# Patient Record
Sex: MALE | Race: BLACK OR AFRICAN AMERICAN | NOT HISPANIC OR LATINO | URBAN - METROPOLITAN AREA
[De-identification: names, ages, dates, MRNs, and addresses within clinical notes are randomized per-mention and may not be internally consistent; named-entity substitution may affect disease eponyms.]

---

## 2019-03-07 ENCOUNTER — EMERGENCY (EMERGENCY)
Facility: HOSPITAL | Age: 44
LOS: 1 days | Discharge: HOME | End: 2019-03-07
Admitting: EMERGENCY MEDICINE
Payer: COMMERCIAL

## 2019-03-07 VITALS
OXYGEN SATURATION: 97 % | RESPIRATION RATE: 17 BRPM | HEART RATE: 73 BPM | TEMPERATURE: 98 F | SYSTOLIC BLOOD PRESSURE: 134 MMHG | DIASTOLIC BLOOD PRESSURE: 89 MMHG

## 2019-03-07 DIAGNOSIS — Z90.49 ACQUIRED ABSENCE OF OTHER SPECIFIED PARTS OF DIGESTIVE TRACT: Chronic | ICD-10-CM

## 2019-03-07 PROCEDURE — 99283 EMERGENCY DEPT VISIT LOW MDM: CPT

## 2019-03-07 RX ORDER — LIDOCAINE 4 G/100G
1 CREAM TOPICAL ONCE
Qty: 0 | Refills: 0 | Status: COMPLETED | OUTPATIENT
Start: 2019-03-07 | End: 2019-03-07

## 2019-03-07 RX ORDER — CYCLOBENZAPRINE HYDROCHLORIDE 10 MG/1
1 TABLET, FILM COATED ORAL
Qty: 10 | Refills: 0 | OUTPATIENT
Start: 2019-03-07

## 2019-03-07 RX ORDER — IBUPROFEN 200 MG
1 TABLET ORAL
Qty: 15 | Refills: 0 | OUTPATIENT
Start: 2019-03-07

## 2019-03-07 RX ORDER — KETOROLAC TROMETHAMINE 30 MG/ML
30 SYRINGE (ML) INJECTION ONCE
Qty: 0 | Refills: 0 | Status: DISCONTINUED | OUTPATIENT
Start: 2019-03-07 | End: 2019-03-07

## 2019-03-07 RX ADMIN — LIDOCAINE 1 PATCH: 4 CREAM TOPICAL at 15:48

## 2019-03-07 RX ADMIN — Medication 30 MILLIGRAM(S): at 15:48

## 2019-03-07 NOTE — ED PROVIDER NOTE - CHPI ED SYMPTOMS NEG
no tingling/no numbness/no bowel dysfunction/no bladder dysfunction no tingling/no motor function loss/no numbness/no bowel dysfunction/no difficulty bearing weight/no bladder dysfunction

## 2019-03-07 NOTE — ED PROVIDER NOTE - PHYSICAL EXAMINATION
left sided paraspinal lumbar ttp, no midline ttp, normal gait, positive left sided leg raise, strength 5/5 all extremities, NV intact Back: left sided paraspinal lumbar TTP, no midline TTP, normal gait, positive left sided SLR, strength 5/5 all extremities, NV intact.

## 2019-03-07 NOTE — ED PROVIDER NOTE - CLINICAL SUMMARY MEDICAL DECISION MAKING FREE TEXT BOX
43 y/o male with left sided back pain likely low back strain/spasm pain control follow up PMD/spine.

## 2019-03-07 NOTE — ED PROVIDER NOTE - OBJECTIVE STATEMENT
43 y/o male with no significant PMH presents to ER c/o 1 wk left sided low back pain. Pt reports intermittent similar back pain over last 5 years. Pt reports MRI last year with no significant findings. Pt's job involves heavy lifting. Pt denies fall or trauma, weakness, numbness, tingling, fecal/urinary incontinence, fevers, chills, dysuria, frequency. pt states pain radiates down left leg only when ambulating. PSH appendectomy. 45 y/o male with no significant PMHx presents to the ER c/o 1 week of left sided low back pain. Pt reports intermittent similar back pain over the last 5 years. Pt reports MRI last year with no significant findings. Pt's job involves heavy lifting. Pt denies fall, trauma, weakness, numbness, tingling, fecal/urinary incontinence, fevers, chills, dysuria, frequency. Pt states pain radiates down his left leg only when ambulating. PSHx appendectomy.

## 2020-03-03 ENCOUNTER — EMERGENCY (EMERGENCY)
Facility: HOSPITAL | Age: 45
LOS: 1 days | Discharge: HOME | End: 2020-03-03
Attending: EMERGENCY MEDICINE | Admitting: EMERGENCY MEDICINE
Payer: COMMERCIAL

## 2020-03-03 VITALS
RESPIRATION RATE: 18 BRPM | TEMPERATURE: 98 F | SYSTOLIC BLOOD PRESSURE: 149 MMHG | OXYGEN SATURATION: 96 % | HEART RATE: 85 BPM | DIASTOLIC BLOOD PRESSURE: 85 MMHG

## 2020-03-03 VITALS
HEART RATE: 64 BPM | DIASTOLIC BLOOD PRESSURE: 82 MMHG | RESPIRATION RATE: 19 BRPM | TEMPERATURE: 98 F | OXYGEN SATURATION: 99 % | SYSTOLIC BLOOD PRESSURE: 117 MMHG

## 2020-03-03 DIAGNOSIS — Z90.49 ACQUIRED ABSENCE OF OTHER SPECIFIED PARTS OF DIGESTIVE TRACT: Chronic | ICD-10-CM

## 2020-03-03 PROCEDURE — 99284 EMERGENCY DEPT VISIT MOD MDM: CPT

## 2020-03-03 RX ORDER — CYCLOBENZAPRINE HYDROCHLORIDE 10 MG/1
1 TABLET, FILM COATED ORAL
Qty: 12 | Refills: 0
Start: 2020-03-03 | End: 2020-03-06

## 2020-03-03 RX ORDER — LIDOCAINE 4 G/100G
1 CREAM TOPICAL ONCE
Refills: 0 | Status: COMPLETED | OUTPATIENT
Start: 2020-03-03 | End: 2020-03-03

## 2020-03-03 RX ORDER — KETOROLAC TROMETHAMINE 30 MG/ML
15 SYRINGE (ML) INJECTION ONCE
Refills: 0 | Status: DISCONTINUED | OUTPATIENT
Start: 2020-03-03 | End: 2020-03-03

## 2020-03-03 RX ORDER — ACETAMINOPHEN 500 MG
2 TABLET ORAL
Qty: 360 | Refills: 0
Start: 2020-03-03 | End: 2020-04-01

## 2020-03-03 RX ORDER — ACETAMINOPHEN 500 MG
975 TABLET ORAL ONCE
Refills: 0 | Status: COMPLETED | OUTPATIENT
Start: 2020-03-03 | End: 2020-03-03

## 2020-03-03 RX ADMIN — Medication 15 MILLIGRAM(S): at 09:26

## 2020-03-03 RX ADMIN — Medication 975 MILLIGRAM(S): at 09:28

## 2020-03-03 RX ADMIN — LIDOCAINE 1 PATCH: 4 CREAM TOPICAL at 09:28
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
## 2020-03-03 NOTE — ED PROVIDER NOTE - PATIENT PORTAL LINK FT
You can access the FollowMyHealth Patient Portal offered by [facility] by registering at the following website: [email or web address]. By joining [employer]’s FollowMyHealth portal, you will also be able to view your health information using other applications (apps) compatible with our system.
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
## 2020-03-03 NOTE — ED PROVIDER NOTE - PROGRESS NOTE DETAILS
Sandhya Uribe MD: Moderate improvement of pain. Will dc with flexeril, naproxen, tylenol and PMD f/u.
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
## 2020-03-03 NOTE — ED PROVIDER NOTE - PHYSICAL EXAMINATION
CONSTITUTIONAL: Nontoxic, well nourished, well developed, middle-aged male, resting comfortably in no acute distress  HEAD: Normocephalic; atraumatic  EYES: Normal inspection, EOMI  ENMT: External appears normal; normal oropharynx  NECK: Supple; non-tender; no cervical lymphadenopathy, good ROM of neck   CARD: RRR; no audible murmurs, rubs, or gallops  RESP: No respiratory distress, lungs ctab/l  ABD: Soft, non-distended; non-tender; no rebound or guarding  EXT: No LE pitting edema or calf tenderness; distal pulses intact with good capillary refill  MSK: no midline TTP, no step offs   SKIN: Warm, dry, intact  NEURO: aaox3, 5/5 strength b/l UE, sensation intact in all extremities

## 2020-03-03 NOTE — ED PROVIDER NOTE - NSFOLLOWUPINSTRUCTIONS_ED_ALL_ED_FT
-Take naproxen/acetaminophen for pain as needed.  Take 1-2 tablets of naproxen 250mg every 12 hours as needed for moderate pain--take with food. Do not take this medication if you have a bleeding disorder, stomach or GI ulcer problems or liver disease. Take 2 regular strength acetaminophen (650mg) or 1 extra strength (500mg) of acetaminophen every 6 hours. If you have any questions please consult a pharmacist or your PMD.   -Take 5mg of cyclobenzaprine for muscle spasm. Do not drive on this medication as it can make you drowsy.  -Get lidocaine patch over the counter and apply to affected area for 12 hours on and 12 hours OFF.   -Follow up with your primary care provider in 24-48 hours.   -A copy of resulted labs, imaging, and findings have been provided to you.   -As we discussed, please return to the Emergency Department if you experience any new/worsening symptoms, including, but are not limited to: numbness or tingling, loss of sensation, loss of motor function, or any other concerning symptoms.  -If you have issues obtaining follow up, please call: [phone] to obtain a doctor or specialist who takes your insurance in your area.  You may call [phone] to make an appointment with the internal medicine clinic.

## 2021-02-18 ENCOUNTER — EMERGENCY (EMERGENCY)
Facility: HOSPITAL | Age: 46
LOS: 1 days | Discharge: HOME | End: 2021-02-18
Attending: EMERGENCY MEDICINE | Admitting: EMERGENCY MEDICINE
Payer: COMMERCIAL

## 2021-02-18 VITALS
DIASTOLIC BLOOD PRESSURE: 83 MMHG | SYSTOLIC BLOOD PRESSURE: 135 MMHG | OXYGEN SATURATION: 99 % | RESPIRATION RATE: 16 BRPM | TEMPERATURE: 98 F | HEART RATE: 115 BPM

## 2021-02-18 VITALS
HEART RATE: 98 BPM | OXYGEN SATURATION: 100 % | SYSTOLIC BLOOD PRESSURE: 130 MMHG | TEMPERATURE: 98 F | RESPIRATION RATE: 16 BRPM | DIASTOLIC BLOOD PRESSURE: 90 MMHG

## 2021-02-18 DIAGNOSIS — Z90.49 ACQUIRED ABSENCE OF OTHER SPECIFIED PARTS OF DIGESTIVE TRACT: Chronic | ICD-10-CM

## 2021-02-18 PROCEDURE — 20552 NJX 1/MLT TRIGGER POINT 1/2: CPT

## 2021-02-18 PROCEDURE — 99284 EMERGENCY DEPT VISIT MOD MDM: CPT | Mod: 25

## 2021-02-18 RX ORDER — LIDOCAINE 4 G/100G
1 CREAM TOPICAL ONCE
Refills: 0 | Status: COMPLETED | OUTPATIENT
Start: 2021-02-18 | End: 2021-02-18

## 2021-02-18 RX ORDER — KETOROLAC TROMETHAMINE 30 MG/ML
15 SYRINGE (ML) INJECTION ONCE
Refills: 0 | Status: DISCONTINUED | OUTPATIENT
Start: 2021-02-18 | End: 2021-02-18

## 2021-02-18 RX ADMIN — LIDOCAINE 1 PATCH: 4 CREAM TOPICAL at 12:08

## 2021-02-18 RX ADMIN — Medication 15 MILLIGRAM(S): at 12:07
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
## 2021-02-18 NOTE — ED ADULT NURSE NOTE - OBJECTIVE STATEMENT
Pt awake, alert and oriented x 4 c/o neck pain for years after work injury.   lidocaine patch applied and toradol given IM.   Pt denies fever or headache.   Denies PMH.
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
## 2021-02-18 NOTE — ED PROVIDER NOTE - PATIENT PORTAL LINK FT
You can access the FollowMyHealth Patient Portal offered by [facility] by registering at the following website: [email or web address]. By joining [employer]’s FollowMyHealth portal, you will also be able to view your health information using other applications (apps) compatible with our system.

## 2021-02-18 NOTE — ED PROVIDER NOTE - OBJECTIVE STATEMENT
47yo M who presents with L neck sharp shooting pain radiating down arm for 2 days. Patient denies any numbness, tingling, weakness, fevers/chills, or skin changes. Went to urgent care yesterday and received a prescription for musle relaxants and NSAIDs that showed moderate improvement. Coming to ED for lack of resolution of symptoms. Of note, patient was seen for similar symptoms ~1 year ago.

## 2021-02-18 NOTE — ED PROVIDER NOTE - NSFOLLOWUPCLINICS_GEN_ALL_ED_FT
[facility] Sports Medicine  Sports Medicine  [address] [place] [address]  Phone: [phone]  Fax:   Follow Up Time: 4-6 Days

## 2021-02-18 NOTE — ED ADULT NURSE NOTE - NSIMPLEMENTINTERV_GEN_ALL_ED
Implemented All Universal Safety Interventions:  [place] to call system. Call bell, personal items and telephone within reach. Instruct patient to call for assistance. Room bathroom lighting operational. Non-slip footwear when patient is off stretcher. Physically safe environment: no spills, clutter or unnecessary equipment. Stretcher in lowest position, wheels locked, appropriate side rails in place.

## 2021-02-18 NOTE — ED ADULT TRIAGE NOTE - CHIEF COMPLAINT QUOTE
Pt presents to ED ambulatory from home with c/o nerve pain to L side of neck, shoulder and arm x 2 days. Pt was seen her approx 1 year ago for similar complaint. Pt denies trauma, numbness tingling,

## 2021-02-18 NOTE — ED PROVIDER NOTE - PROGRESS NOTE DETAILS
Delvin, PGY1: Patient tolerated lido injection well and is noting symptomatic improvement. Given that patient already has muscle relaxants + NSAIDs at home, will d/c with strict return precautions and recommendations to see sports medicine as an outpatient.

## 2021-02-18 NOTE — ED PROVIDER NOTE - NSFOLLOWUPINSTRUCTIONS_ED_ALL_ED_FT
Follow up as outpatient and consider pharmacological treatment.    We evaluated you in the emergency room and it appears that you have a muscle strain.     We recommend that you rest, ice and take tylenol(650 mg up to three times a day)/motrin(600 mg up to three times a day) for your symptoms.     After 48 hours please use heat on the area that is hurting.     If you still have persistent pain after 5-7 days after the injury please be re-evaluated as there could be a more severe diagnosis than just a strain. If you develop numbness, weakness, change in color of your extremities (turn blue or white), worsening pain please seek immediate medical care for repeat evaluation.

## 2021-02-18 NOTE — ED PROVIDER NOTE - NS ED ROS FT
General: denies fever, chills  HENT: denies nasal congestion, rhinorrhea  Eyes: denies visual changes, blurred vision  CV: denies chest pain, palpitations  Resp: denies difficulty breathing, cough  Abdominal: denies nausea, vomiting, diarrhea, abdominal pain  : denies urinary pain or discharge  MSK: left sided neck pain  Neuro: denies headaches, numbness, tingling  Skin: denies rashes, bruises

## 2021-02-18 NOTE — ED PROVIDER NOTE - PHYSICAL EXAMINATION
GENERAL: well appearing in no acute distress, non-toxic appearing  HEAD: normocephalic, atraumatic  HENT: airway intact; left sided neck pain w/ FROM  EYES: normal conjunctiva  CARDIAC: regular rate and rhythm, normal S1S2, no appreciable murmurs, 2+ pulses in UE/LE b/l  PULM: normal breath sounds, clear to ascultation bilaterally, no rales, rhonchi, wheezing  GI: abdomen nondistended, soft, nontender, no guarding, rebound tenderness  : no CVA tenderness b/l, no suprapubic tenderness  NEURO: no focal motor or sensory deficits, CN2-12 intact, normal speech, normal gait, AAOx3  MSK: no peripheral edema, no calf tenderness b/l  SKIN: well-perfused, extremities warm, no visible rashes  PSYCH: appropriate mood and affect

## 2022-08-06 ENCOUNTER — EMERGENCY (EMERGENCY)
Facility: HOSPITAL | Age: 47
LOS: 1 days | Discharge: HOME | End: 2022-08-06
Attending: EMERGENCY MEDICINE | Admitting: STUDENT IN AN ORGANIZED HEALTH CARE EDUCATION/TRAINING PROGRAM

## 2022-08-06 VITALS
TEMPERATURE: 97 F | RESPIRATION RATE: 18 BRPM | HEART RATE: 87 BPM | DIASTOLIC BLOOD PRESSURE: 70 MMHG | OXYGEN SATURATION: 97 % | SYSTOLIC BLOOD PRESSURE: 110 MMHG

## 2022-08-06 DIAGNOSIS — Z90.49 ACQUIRED ABSENCE OF OTHER SPECIFIED PARTS OF DIGESTIVE TRACT: Chronic | ICD-10-CM

## 2022-08-06 PROCEDURE — 99283 EMERGENCY DEPT VISIT LOW MDM: CPT

## 2022-08-06 NOTE — ED ADULT TRIAGE NOTE - CHIEF COMPLAINT QUOTE
Pt c/o right hand pain. Pt states, "I might have something in the palm of my hand because it is swollen and it hurts to move my thumb." Denies PMH.

## 2022-08-07 VITALS
TEMPERATURE: 98 F | RESPIRATION RATE: 18 BRPM | SYSTOLIC BLOOD PRESSURE: 119 MMHG | OXYGEN SATURATION: 97 % | HEART RATE: 73 BPM | DIASTOLIC BLOOD PRESSURE: 76 MMHG

## 2022-08-07 PROCEDURE — 73130 X-RAY EXAM OF HAND: CPT | Mod: 26,RT

## 2022-08-07 RX ORDER — TETANUS TOXOID, REDUCED DIPHTHERIA TOXOID AND ACELLULAR PERTUSSIS VACCINE, ADSORBED 5; 2.5; 8; 8; 2.5 [IU]/.5ML; [IU]/.5ML; UG/.5ML; UG/.5ML; UG/.5ML
0.5 SUSPENSION INTRAMUSCULAR ONCE
Refills: 0 | Status: COMPLETED | OUTPATIENT
Start: 2022-08-07 | End: 2022-08-07

## 2022-08-07 RX ADMIN — TETANUS TOXOID, REDUCED DIPHTHERIA TOXOID AND ACELLULAR PERTUSSIS VACCINE, ADSORBED 0.5 MILLILITER(S): 5; 2.5; 8; 8; 2.5 SUSPENSION INTRAMUSCULAR at 03:03

## 2022-08-07 RX ADMIN — Medication 1 TABLET(S): at 07:24

## 2022-08-07 NOTE — ED PROVIDER NOTE - PATIENT PORTAL LINK FT
You can access the FollowMyHealth Patient Portal offered by [facility] by registering at the following website: [email or web address]. By joining [employer]’s FollowMyHealth portal, you will also be able to view your health information using other applications (apps) compatible with our system.

## 2022-08-07 NOTE — ED PROVIDER NOTE - OBJECTIVE STATEMENT
48 y/o M concerned that he may have a foreign body in right hand after he rubbed hand on a truck yesterday and got stuck.  Since having periodic pain with movement of thumb and wanted to check if foreign body.  No other injury.  No fever.  No hand numbness or tingling. 46 y/o M concerned that he may have a foreign body in right hand after he rubbed hand on a truck yesterday and got stuck.  Since having periodic pain with movement of thumb and wanted to check if foreign body.  No other injury.  No fever.  No hand numbness or tingling.  Unsure of last tetanus, possibly 9-10 years ago.

## 2022-08-07 NOTE — ED PROVIDER NOTE - CLINICAL SUMMARY MEDICAL DECISION MAKING FREE TEXT BOX
48 y/o M s/p puncture to right hand on Friday.  Concern for FB.  Exam without evidence of infection.  Will check xray.  Currently antibiotics not indicated as no signs of infection.  But will require wound recheck in 2 days to re-eval for signs of infection. 46 y/o M s/p puncture to right hand on Friday.  Concern for FB.  Exam without evidence of infection.  Will check xray.  Currently antibiotics not indicated as no signs of infection.  But will require wound recheck in 2 days to re-eval for signs of infection.  Will update tetanus.

## 2022-08-07 NOTE — ED PROVIDER NOTE - PHYSICAL EXAMINATION
ATTENDING PHYSICAL EXAM  GEN - NAD; well appearing; A+O x3  HEAD - NC/AT  PULMONARY - CTA b/l, symmetric breath sounds, no w/r/r  CARDIAC -s1s2, RRR, no M,R,G  ABDOMEN - +NABS, ND, NT, soft, no guarding, no rebound, no masses   BACK - no CVA tenderness, No vertebral or paravertebral tenderness  EXTREMITIES - Right wrist FROM, 2 + rad pulse.  Right hand with 1mm puncture site noted over thenar prominence.  No swelling, erythema, warmth.  No pus drainage.  No TTP or palpable FB appreciated.  Hand and right thumb with FROM.  Distal PMS intact.

## 2022-08-07 NOTE — ED PROVIDER NOTE - PROGRESS NOTE DETAILS
Joseph WATERS: Received sign out from my colleague Dr. Zhang pt presents with c/f possible FB in hand since friday, xr w c/f FB got tetnaus shot, discussed with dr neal from hand will see pt in office on monday, rec dc w abx, pt agreeable w plan stable for dc.

## 2022-08-07 NOTE — ED PROVIDER NOTE - NSFOLLOWUPINSTRUCTIONS_ED_ALL_ED_FT
Thank you for visiting our Emergency Department, it has been a pleasure taking part in your healthcare.    Your discharge diagnosis is: foreign body  Please take all discharge medications as indicated below:  Take Motrin/Tylenol for pain as needed, please follow instructions on manufacturers label. If you have any questions please consult a pharmacist or your PMD.  Augmentin 875 twice a day x7 days  Please follow up with your PMD within x48 hours.  Please follow up with Hand Surgery within x48 hours  A copy of resulted labs, imaging, and findings have been provided to you.   You have had a detailed discussion with your provider regarding your diagnosis, care management and discharge planning including, but not limited to: return precautions, follow up visits with existing or new providers, new prescriptions and/or medication changes, wound and/or splint/cast care or other care   aspects specific to your diagnosis and treatment. You have been given the opportunity to have your questions answered. At this time you have been deemed stable and fit for discharge.  Return precautions to the Emergency Department include but are not limited to: unrelenting nausea, vomiting, fever, chills, chest pain, shortness of breath, dizziness, chest or abdominal pain, worsening back pain, syncope, blood in urine or stool, headache that doesn't resolve, numbness or tingling, loss of sensation, loss of motor function, or any other concerning symptoms.

## 2022-08-07 NOTE — ED PROVIDER NOTE - CARE PROVIDER_API CALL
Choco Hays (MD)  Plastic Surgery  [address] [place] [address], [address]  [place], [place] [id number]  Phone: [phone]  Fax: [phone]  Follow Up Time:

## 2023-01-30 ENCOUNTER — EMERGENCY (EMERGENCY)
Facility: HOSPITAL | Age: 48
LOS: 1 days | Discharge: HOME | End: 2023-01-30
Admitting: STUDENT IN AN ORGANIZED HEALTH CARE EDUCATION/TRAINING PROGRAM
Payer: COMMERCIAL

## 2023-01-30 VITALS
DIASTOLIC BLOOD PRESSURE: 70 MMHG | TEMPERATURE: 98 F | OXYGEN SATURATION: 97 % | SYSTOLIC BLOOD PRESSURE: 134 MMHG | RESPIRATION RATE: 16 BRPM | HEART RATE: 84 BPM

## 2023-01-30 DIAGNOSIS — Z90.49 ACQUIRED ABSENCE OF OTHER SPECIFIED PARTS OF DIGESTIVE TRACT: Chronic | ICD-10-CM

## 2023-01-30 PROCEDURE — 99284 EMERGENCY DEPT VISIT MOD MDM: CPT

## 2023-01-30 RX ORDER — KETOROLAC TROMETHAMINE 30 MG/ML
30 SYRINGE (ML) INJECTION ONCE
Refills: 0 | Status: DISCONTINUED | OUTPATIENT
Start: 2023-01-30 | End: 2023-01-30

## 2023-01-30 RX ADMIN — Medication 30 MILLIGRAM(S): at 10:35

## 2023-01-30 NOTE — ED PROVIDER NOTE - NSFOLLOWUPINSTRUCTIONS_ED_ALL_ED_FT
Take Motrin 600 mg every 8 hours with food.  Take Tylenol 650 mg every 6-8 hours.  Avoid any strenuous activity.  Follow-up with orthospine within 1 to 2 weeks.  Return to ED for any worsening weakness, pain, fever or any other concerning symptoms.

## 2023-01-30 NOTE — ED ADULT TRIAGE NOTE - CHIEF COMPLAINT QUOTE
c/o acute on chronic right sided neck pain/right shoulder pain x 1 week. Pt states has been getting this pain every winter season for the past 4 years. Hx herniated disc.

## 2023-01-30 NOTE — ED PROVIDER NOTE - CLINICAL SUMMARY MEDICAL DECISION MAKING FREE TEXT BOX
48-year-old male history of cervical disc disease presents to ED complaining of right side of neck pain radiating down the right arm x couple days.  Patient states once a year would get a flare where he would have pain again.  States pain is worse at nighttime.  Is taken Motrin, Tylenol, gabapentin with minimal improvement.  States in the past would get an injection which would alleviate his pain.  Denies any fall, trauma, fever, CP, SOB, weakness.  Currently pain is 6/10.    On exam no neuro deficits, right sided trapezius muscle tenderness and paraspinal tenderness.  Last visits patient receive toradol injection which he says has helped him long term.  will give toradol injection and have patient follow up with ortho spine.

## 2023-01-30 NOTE — ED PROVIDER NOTE - OBJECTIVE STATEMENT
48-year-old male history of cervical disc disease presents to ED complaining of right side of neck pain radiating down the right arm x couple days.  Patient states once a year would get a flare where he would have pain again.  States pain is worse at nighttime.  Is taken Motrin, Tylenol, gabapentin with minimal improvement.  States in the past would get an injection which would alleviate his pain.  Denies any fall, trauma, fever, CP, SOB, weakness.  Currently pain is 6/10.

## 2023-01-30 NOTE — ED PROVIDER NOTE - MUSCULOSKELETAL MINIMAL EXAM
Mild tenderness to right trapezius muscle and right cervical paraspinal region.  No midline tenderness of the cervical spine.  Full range of motion of cervical spine.  Muscle strength 5/5 bilaterally upper extremities.

## 2023-01-30 NOTE — ED PROVIDER NOTE - PATIENT PORTAL LINK FT
You can access the FollowMyHealth Patient Portal offered by [facility] by registering at the following website: [email or web address]. By joining [employer]’s FollowMyHealth portal, you will also be able to view your health information using other applications (apps) compatible with our system.

## 2023-02-01 ENCOUNTER — EMERGENCY (EMERGENCY)
Facility: HOSPITAL | Age: 48
LOS: 1 days | Discharge: HOME | End: 2023-02-01
Attending: STUDENT IN AN ORGANIZED HEALTH CARE EDUCATION/TRAINING PROGRAM | Admitting: STUDENT IN AN ORGANIZED HEALTH CARE EDUCATION/TRAINING PROGRAM
Payer: COMMERCIAL

## 2023-02-01 VITALS
OXYGEN SATURATION: 100 % | TEMPERATURE: 98 F | DIASTOLIC BLOOD PRESSURE: 74 MMHG | HEART RATE: 87 BPM | RESPIRATION RATE: 18 BRPM | SYSTOLIC BLOOD PRESSURE: 115 MMHG

## 2023-02-01 DIAGNOSIS — Z90.49 ACQUIRED ABSENCE OF OTHER SPECIFIED PARTS OF DIGESTIVE TRACT: Chronic | ICD-10-CM

## 2023-02-01 PROCEDURE — 99284 EMERGENCY DEPT VISIT MOD MDM: CPT | Mod: 25

## 2023-02-01 PROCEDURE — 20552 NJX 1/MLT TRIGGER POINT 1/2: CPT

## 2023-02-01 RX ORDER — DIAZEPAM 5 MG
5 TABLET ORAL ONCE
Refills: 0 | Status: DISCONTINUED | OUTPATIENT
Start: 2023-02-01 | End: 2023-02-01

## 2023-02-01 RX ORDER — KETOROLAC TROMETHAMINE 30 MG/ML
30 SYRINGE (ML) INJECTION ONCE
Refills: 0 | Status: DISCONTINUED | OUTPATIENT
Start: 2023-02-01 | End: 2023-02-01

## 2023-02-01 RX ORDER — LIDOCAINE 4 G/100G
1 CREAM TOPICAL ONCE
Refills: 0 | Status: COMPLETED | OUTPATIENT
Start: 2023-02-01 | End: 2023-02-01

## 2023-02-01 RX ORDER — TRIAMCINOLONE 4 MG
40 TABLET ORAL ONCE
Refills: 0 | Status: COMPLETED | OUTPATIENT
Start: 2023-02-01 | End: 2023-02-01

## 2023-02-01 RX ORDER — BUPIVACAINE HCL/PF 7.5 MG/ML
10 VIAL (ML) INJECTION ONCE
Refills: 0 | Status: COMPLETED | OUTPATIENT
Start: 2023-02-01 | End: 2023-02-01

## 2023-02-01 RX ADMIN — LIDOCAINE 1 PATCH: 4 CREAM TOPICAL at 10:23

## 2023-02-01 RX ADMIN — Medication 30 MILLIGRAM(S): at 10:23

## 2023-02-01 RX ADMIN — Medication 30 MILLIGRAM(S): at 10:53

## 2023-02-01 RX ADMIN — Medication 5 MILLIGRAM(S): at 10:23

## 2023-02-01 RX ADMIN — Medication 40 MILLIGRAM(S): at 11:59

## 2023-02-01 RX ADMIN — Medication 10 MILLILITER(S): at 11:59

## 2023-02-01 NOTE — ED PROVIDER NOTE - CLINICAL SUMMARY MEDICAL DECISION MAKING FREE TEXT BOX
48-year-old male with past medical history of herniated disc, presenting with intermittent worsening episodes of right neck and shoulder pain.  suggestive of cervical radiculopathy, unlikely central pathology due to unremarkable exam findings. will attempt pain control, possible trigger point injection, and spine f/u.

## 2023-02-01 NOTE — ED ADULT NURSE NOTE - OBJECTIVE STATEMENT
Patient presents to the ED for chronic, recurrent right neck and shoulder pain. States he was seen here yesterday and pain has come back. He reports history of herniated disc 5 years ago and injury to right shoulder from job a few years ago. Patient AA&Ox4, in no acute distress, calm, cooperative. Ambulatory, steady gait. Respirations even, unlabored on room air. Denies CP, dizziness, SOB, dyspnea, N/V. Medicated as ordered.

## 2023-02-01 NOTE — ED PROVIDER NOTE - PROGRESS NOTE DETAILS
DOLORES Laird MD  trigger pt injection given in right neck with bupivicaine and kenalog. pt had reduction in pain, can range neck more. will dc with spine f/u

## 2023-02-01 NOTE — ED PROVIDER NOTE - RESPIRATORY, MLM
[facility]       Lluvia Toro, KILO  11/09/22 1926 Breath sounds clear and equal bilaterally.

## 2023-02-01 NOTE — ED PROVIDER NOTE - NSFOLLOWUPINSTRUCTIONS_ED_ALL_ED_FT
Activities as tolerated. Please encourage good oral and fluid intake. For pain, please take Motrin 400mg every 4 hours as needed, or Tylenol 650mg every 6 hours as needed.    Please see your primary care doctor within 24-48 hours for further management of your symptoms.  Please see spine doctor in one week for further evaluation of your symptoms.    Please seek emergent medical management if you have any worsening signs or symptoms, such as worsening pain, new onset weakness/numbness.

## 2023-02-01 NOTE — ED PROVIDER NOTE - OBJECTIVE STATEMENT
48-year-old male with past medical history of herniated disc, presenting with intermittent worsening episodes of right neck and shoulder pain.  Patient states that he has had intermittent right neck pain for years, but has been having worsening episodes over the past 2 weeks.  Patient states that the right neck pain radiates to right shoulder, worse with movement, unrelieved by wife's gabapentin prescription, Advil, Tylenol.  Patient was seen in ER 2 days ago where patient was given intramuscular Toradol injection which did not relieve the pain.  Patient scheduling spinal doctor follow-up.  Patient came in today for worsening neck pain.  Neck pain radiates to fingers on the right side occasionally.    Denies weakness, chest pain, injury.

## 2023-02-01 NOTE — ED PROCEDURE NOTE - PROCEDURE ADDITIONAL DETAILS
The patient presented with trigger points in the Right  trapezius muscles of the cervical region.  Injections of bupivicaine/triamcinalone were injected into 2 area areas of each left and right side paraspinal muscles.  The patient tolerated the procedure well, reporting improvement of the pain. There were no complications. Estimated blood loss was minimal. There were no complications to the procedure. CPT=20552

## 2023-02-01 NOTE — ED PROVIDER NOTE - PATIENT PORTAL LINK FT
You can access the FollowMyHealth Patient Portal offered by [facility] by registering at the following website: [email or web address]. By joining [employer]’s FollowMyHealth portal, you will also be able to view your health information using other applications (apps) compatible with our system.

## 2023-02-01 NOTE — ED PROVIDER NOTE - CPE EDP GASTRO NORM
Patient:   SHUKRI JOSHI            MRN: [id number]            FIN: [id number]               Age:   71 years     Sex:  FEMALE     :  44   Associated Diagnoses:   None   Author:   TATUM ASENCIO        Discharge diagnoses:   Abdominal pain, Rpt CT abdomen with appendix epiploicae inflammation  Breast cancer in remission  Hypothyroid  Left ear ache: resolved spontaneously       Hospital course:   70 yo female, hx of Hashimoto's thyroiditis and breast cancer, presented with RLQ abd pain. She was afebrile, WBC was 10.  CT of abdomen/pelvis was reviewed by surgical service and showed mild pericecal inflammation suggesting colitis vs right sided diverticultis.  She was placed npo, started on IV cipro/flagyl and surgical consult was requested.   ( Of note, pt had similar episode in , when she underwent a diagnostic laparoscopy with Dr. Raines and was found to have transverse colon appendagitis.)    A repeat CT abdomen/pelvis revealed acute epiploic appendagitis involving the right colon, the severity is similar but its position has changed with the colon.    The patient's symptoms were improving and diet was advanced as tolerated.   The patient was cleared for discharge 1/10/18.            Pt seen and examined on date of discharge.  See Progress Note         DISCHARGE MEDICATION LIST   Allergies: PCN (penicillins)     MEDICATION  DOSE  ROUTE  FREQUENCY  SPECIAL INSTRUCTIONS   acetaminophen-HYDROcodone (Norco oral 325-5 mg tablet)  1 tab  Oral  Every 4 hours As Needed: for pain     levothyroxine (levothyroxine 25 mcg (0.025 mg) oral capsule)  25 mcg=1 cap  Oral  Daily         Primary Care Physician      Physician Name:  MYRNA ARNOLD  Specialty :  FAMILY PRACTICE - RETIRED.    Consulting Physicians     Physician Name:  SURESH ESCOBAR Speciality:  COLON/RECTAL SURGERY Consult Reason:  Abdominal pain, pericecal inflammation   Physician Name:  MICH WHITE Speciality:  GENERAL SURGERY  Consult Reason:  abd pain       Follow-up instructions:    Pt's prev pcp retired.  Wants to find new Polish speaking physician.  Declines assistance in trying to set up appt.  She will research and find her own (Fawad Murrieta Kuptel).  Recommend f/u 1-2 weeks.  F/U Dr. Lorenzo 1 week.       I spent 35 minutes completing this patient's discharge.             Electronically Signed On 01/25/2018 05:12  __________________________________________________   TATUM ASENCIO     normal...

## 2023-02-01 NOTE — ED PROVIDER NOTE - ATTENDING CONTRIBUTION TO CARE
I (Blank) agree with above, I performed a history and physical. Counseled taj medical staff, physician assistant, and/or medical student on medical decision making as documented. Medical decisions and treatment interventions were made in real time during the patient encounter. Additionally and/or with the following exceptions: Patient is a 48-year-old male past medical history herniated disc in the lumbar region who is presenting to the emergency department with right neck pain has been going on for about 2 weeks.  Of note works as a  does lots of heavy lifting.  Took acetaminophen and ibuprofen without relief.  Also tried his wife's gabapentin that did not help either.  Was given in the ER 2 days ago.  Patient is well-appearing completely neuro intact her vital signs are within normal limits.  Patient was amenable to trigger point injections.  He was also given diazepam and lidocaine patch.  Patient had improvement of pain from 10/10 to 6/10, will follow up spine center.

## 2023-02-01 NOTE — ED ADULT TRIAGE NOTE - CHIEF COMPLAINT QUOTE
Pt with co pain to right side of neck and shoulder x 2 weeks  pt states was seen here 2 days ago but does not feel better. Pt reports HX of herniated disc. and pinched nerve.

## 2023-03-06 ENCOUNTER — APPOINTMENT (OUTPATIENT)
Dept: ORTHOPEDIC SURGERY | Facility: CLINIC | Age: 48
End: 2023-03-06
Payer: COMMERCIAL

## 2023-03-06 VITALS
HEART RATE: 87 BPM | SYSTOLIC BLOOD PRESSURE: 118 MMHG | HEIGHT: 70 IN | OXYGEN SATURATION: 97 % | TEMPERATURE: 97.6 F | DIASTOLIC BLOOD PRESSURE: 81 MMHG | WEIGHT: 215 LBS | BODY MASS INDEX: 30.78 KG/M2

## 2023-03-06 DIAGNOSIS — M54.9 DORSALGIA, UNSPECIFIED: ICD-10-CM

## 2023-03-06 PROBLEM — Z00.00 ENCOUNTER FOR PREVENTIVE HEALTH EXAMINATION: Status: ACTIVE | Noted: 2023-03-06

## 2023-03-06 PROCEDURE — 99204 OFFICE O/P NEW MOD 45 MIN: CPT

## 2023-03-06 PROCEDURE — 72110 X-RAY EXAM L-2 SPINE 4/>VWS: CPT

## 2023-03-06 PROCEDURE — 72050 X-RAY EXAM NECK SPINE 4/5VWS: CPT

## 2023-03-06 RX ORDER — PREDNISONE 10 MG/1
10 TABLET ORAL
Qty: 39 | Refills: 0 | Status: ACTIVE | COMMUNITY
Start: 2023-03-06 | End: 1900-01-01

## 2023-03-06 RX ORDER — PANTOPRAZOLE 40 MG/1
40 TABLET, DELAYED RELEASE ORAL DAILY
Qty: 30 | Refills: 0 | Status: ACTIVE | COMMUNITY
Start: 2023-03-06 | End: 1900-01-01

## 2023-03-06 RX ORDER — TIZANIDINE 2 MG/1
2 TABLET ORAL EVERY 6 HOURS
Qty: 24 | Refills: 0 | Status: ACTIVE | COMMUNITY
Start: 2023-03-06 | End: 1900-01-01

## 2023-03-06 NOTE — PHYSICAL EXAM
[de-identified] : Cervical Physical Exam\par \par Gait - Normal\par \par Station - Normal\par \par Sagittal balance - Normal\par \par Compensatory mechanism? - None\par \par Horizontal gaze - Maintained\par \par Reflexes\par Biceps - Normal\par Triceps - Normal\par Brachioradialis - Normal\par Patellar - Normal\par Gastroc - Normal\par Clonus -No\par \par Maldonado´s - None\par \par Shoulder Exam - Normal\par \par Spurling´s - None\par \par Wrist Pulses -2+ radial/ulnar\par \par Foot Pulses -2+ DP/PT\par \par Cervical range of motion - Normal\par \par Sensation\par C5-T1 sensation intact to light touch bilaterally\par \par L1-S1 sensation intact to light touch bilaterally\par \par Motor\par \par 	Deltoid	Biceps	Triceps	WF	WE	IO	\par Right	2+/5	5/5	5/5	5/5	5/5	5/5	5/5\par Left	5/5	5/5	5/5	5/5	5/5	5/5	5/5\par \par \par 	IP	Quad	HS	TA	Gastroc	EHL\par Right	5/5	5/5	5/5	5/5	5/5	5/5\par Left	5/5	5/5	5/5	5/5	5/5	5/5  [de-identified] : Lumbar Radiographs Reviewed\par facet arthropathy \par Mild disc degeneration\par \par Cervical Radiographs Reviewed \par Facet Arthropathy \par C4-C5 spondylolisthesis\par disc degeneration somewhat advanced for age

## 2023-03-06 NOTE — ADDENDUM
[FreeTextEntry1] : I, Faiza Jimenes, acted solely as a scribe for Dr. Ayden Sosa MD on this date 03/06/2023  \par \par All medical record entries made by the Scribe were at my, Dr. Ayden Sosa MD., direction and personally dictated by me on 03/06/2023 . I have reviewed the chart and agree that the record accurately reflects my personal performance of the history, physical exam, assessment and plan. I have also personally directed, reviewed, and agreed with the chart.
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
## 2023-03-13 ENCOUNTER — EMERGENCY (EMERGENCY)
Facility: HOSPITAL | Age: 48
LOS: 1 days | Discharge: HOME | End: 2023-03-13
Attending: STUDENT IN AN ORGANIZED HEALTH CARE EDUCATION/TRAINING PROGRAM | Admitting: STUDENT IN AN ORGANIZED HEALTH CARE EDUCATION/TRAINING PROGRAM
Payer: COMMERCIAL

## 2023-03-13 VITALS
SYSTOLIC BLOOD PRESSURE: 151 MMHG | RESPIRATION RATE: 18 BRPM | OXYGEN SATURATION: 100 % | TEMPERATURE: 98 F | HEART RATE: 87 BPM | DIASTOLIC BLOOD PRESSURE: 93 MMHG

## 2023-03-13 DIAGNOSIS — Z90.49 ACQUIRED ABSENCE OF OTHER SPECIFIED PARTS OF DIGESTIVE TRACT: Chronic | ICD-10-CM

## 2023-03-13 LAB
ALBUMIN SERPL ELPH-MCNC: 4.1 G/DL — SIGNIFICANT CHANGE UP (ref 3.3–5)
ALP SERPL-CCNC: 68 U/L — SIGNIFICANT CHANGE UP (ref 40–120)
ALT FLD-CCNC: 16 U/L — SIGNIFICANT CHANGE UP (ref 4–41)
ANION GAP SERPL CALC-SCNC: 10 MMOL/L — SIGNIFICANT CHANGE UP (ref 7–14)
AST SERPL-CCNC: 14 U/L — SIGNIFICANT CHANGE UP (ref 4–40)
BASOPHILS # BLD AUTO: 0.04 K/UL — SIGNIFICANT CHANGE UP (ref 0–0.2)
BASOPHILS NFR BLD AUTO: 0.5 % — SIGNIFICANT CHANGE UP (ref 0–2)
BILIRUB SERPL-MCNC: 0.2 MG/DL — SIGNIFICANT CHANGE UP (ref 0.2–1.2)
BUN SERPL-MCNC: 15 MG/DL — SIGNIFICANT CHANGE UP (ref 7–23)
CALCIUM SERPL-MCNC: 9.5 MG/DL — SIGNIFICANT CHANGE UP (ref 8.4–10.5)
CHLORIDE SERPL-SCNC: 104 MMOL/L — SIGNIFICANT CHANGE UP (ref 98–107)
CO2 SERPL-SCNC: 28 MMOL/L — SIGNIFICANT CHANGE UP (ref 22–31)
CREAT SERPL-MCNC: 1.31 MG/DL — HIGH (ref 0.5–1.3)
EGFR: 67 ML/MIN/1.73M2 — SIGNIFICANT CHANGE UP
EOSINOPHIL # BLD AUTO: 0.08 K/UL — SIGNIFICANT CHANGE UP (ref 0–0.5)
EOSINOPHIL NFR BLD AUTO: 1 % — SIGNIFICANT CHANGE UP (ref 0–6)
FLUAV AG NPH QL: SIGNIFICANT CHANGE UP
FLUBV AG NPH QL: SIGNIFICANT CHANGE UP
GLUCOSE SERPL-MCNC: 96 MG/DL — SIGNIFICANT CHANGE UP (ref 70–99)
HCT VFR BLD CALC: 47.9 % — SIGNIFICANT CHANGE UP (ref 39–50)
HGB BLD-MCNC: 15.3 G/DL — SIGNIFICANT CHANGE UP (ref 13–17)
IANC: 4.3 K/UL — SIGNIFICANT CHANGE UP (ref 1.8–7.4)
IMM GRANULOCYTES NFR BLD AUTO: 0.6 % — SIGNIFICANT CHANGE UP (ref 0–0.9)
LYMPHOCYTES # BLD AUTO: 2.69 K/UL — SIGNIFICANT CHANGE UP (ref 1–3.3)
LYMPHOCYTES # BLD AUTO: 33.2 % — SIGNIFICANT CHANGE UP (ref 13–44)
MCHC RBC-ENTMCNC: 29.1 PG — SIGNIFICANT CHANGE UP (ref 27–34)
MCHC RBC-ENTMCNC: 31.9 GM/DL — LOW (ref 32–36)
MCV RBC AUTO: 91.2 FL — SIGNIFICANT CHANGE UP (ref 80–100)
MONOCYTES # BLD AUTO: 0.95 K/UL — HIGH (ref 0–0.9)
MONOCYTES NFR BLD AUTO: 11.7 % — SIGNIFICANT CHANGE UP (ref 2–14)
NEUTROPHILS # BLD AUTO: 4.3 K/UL — SIGNIFICANT CHANGE UP (ref 1.8–7.4)
NEUTROPHILS NFR BLD AUTO: 53 % — SIGNIFICANT CHANGE UP (ref 43–77)
NRBC # BLD: 0 /100 WBCS — SIGNIFICANT CHANGE UP (ref 0–0)
NRBC # FLD: 0 K/UL — SIGNIFICANT CHANGE UP (ref 0–0)
PLATELET # BLD AUTO: 259 K/UL — SIGNIFICANT CHANGE UP (ref 150–400)
POTASSIUM SERPL-MCNC: 3.9 MMOL/L — SIGNIFICANT CHANGE UP (ref 3.5–5.3)
POTASSIUM SERPL-SCNC: 3.9 MMOL/L — SIGNIFICANT CHANGE UP (ref 3.5–5.3)
PROT SERPL-MCNC: 6.5 G/DL — SIGNIFICANT CHANGE UP (ref 6–8.3)
RBC # BLD: 5.25 M/UL — SIGNIFICANT CHANGE UP (ref 4.2–5.8)
RBC # FLD: 13.6 % — SIGNIFICANT CHANGE UP (ref 10.3–14.5)
RSV RNA NPH QL NAA+NON-PROBE: SIGNIFICANT CHANGE UP
SARS-COV-2 RNA SPEC QL NAA+PROBE: SIGNIFICANT CHANGE UP
SODIUM SERPL-SCNC: 142 MMOL/L — SIGNIFICANT CHANGE UP (ref 135–145)
WBC # BLD: 8.11 K/UL — SIGNIFICANT CHANGE UP (ref 3.8–10.5)
WBC # FLD AUTO: 8.11 K/UL — SIGNIFICANT CHANGE UP (ref 3.8–10.5)

## 2023-03-13 PROCEDURE — 99223 1ST HOSP IP/OBS HIGH 75: CPT

## 2023-03-13 RX ORDER — ACETAMINOPHEN 500 MG
1000 TABLET ORAL ONCE
Refills: 0 | Status: COMPLETED | OUTPATIENT
Start: 2023-03-13 | End: 2023-03-14

## 2023-03-13 RX ORDER — KETOROLAC TROMETHAMINE 30 MG/ML
15 SYRINGE (ML) INJECTION ONCE
Refills: 0 | Status: DISCONTINUED | OUTPATIENT
Start: 2023-03-13 | End: 2023-03-13

## 2023-03-13 RX ORDER — MORPHINE SULFATE 50 MG/1
4 CAPSULE, EXTENDED RELEASE ORAL ONCE
Refills: 0 | Status: DISCONTINUED | OUTPATIENT
Start: 2023-03-13 | End: 2023-03-14

## 2023-03-13 RX ORDER — MORPHINE SULFATE 50 MG/1
4 CAPSULE, EXTENDED RELEASE ORAL ONCE
Refills: 0 | Status: DISCONTINUED | OUTPATIENT
Start: 2023-03-13 | End: 2023-03-13

## 2023-03-13 RX ADMIN — Medication 15 MILLIGRAM(S): at 21:03

## 2023-03-13 RX ADMIN — MORPHINE SULFATE 4 MILLIGRAM(S): 50 CAPSULE, EXTENDED RELEASE ORAL at 21:07

## 2023-03-13 RX ADMIN — MORPHINE SULFATE 4 MILLIGRAM(S): 50 CAPSULE, EXTENDED RELEASE ORAL at 21:57

## 2023-03-13 RX ADMIN — Medication 15 MILLIGRAM(S): at 21:56

## 2023-03-13 NOTE — ED CDU PROVIDER INITIAL DAY NOTE - OBJECTIVE STATEMENT
49yo M with chronic neck pain, seen in ED multiple times for same. was recently seen by ortho spine on march 6, dr lechuga and found to have upper extremity weakness. per dr lechuga note "I am concerned in regards to compression along the patient's spinal cord and/or nerve roots. As a result I would like to proceed with an MRI of the patient's cervical spine. In tandem, the patient can continue with a conservative home exercise program. Rx prednisone taper, Tizanidine." pt has been taking both the prednisone taper and tizanidine with no relief and has worsening pain for last 2 days. pt states that there aren't any medications that give him relief. pt attempted to contact dr lechuga today  but was unable to get in touch so came to ed.    CDU ROVERTO Alvarez: Agree with above. Pt continues to endorse pain, meds ordered. No addition complaints.

## 2023-03-13 NOTE — ED PROVIDER NOTE - PHYSICAL EXAMINATION
Gen:  appears uncomfortbale  Head:  NC/AT  neck tender on right side and right arm  slight wekaness noted to upper extremity R   Resp: No distress   Ext: no deformities  Skin: warm and dry as visualized

## 2023-03-13 NOTE — ED PROVIDER NOTE - OBJECTIVE STATEMENT
49yo M with chronic neck pain, seen in ED multiple times for same. was recently seen by ortho spine on march 6, dr lechuga and found to have upper extremity weakness. per dr lechuga note "I am concerned in regards to compression along the patient's spinal cord and/or nerve roots. As a result I would like to proceed with an MRI of the patient's cervical spine. In tandem, the patient can continue with a conservative home exercise program. Rx prednisone taper, Tizanidine." pt has been taking both the prednisone taper and tizanidine with no relief and has worsening pain for last 2 days. pt states that there arent any medications that give him relief. pt attempted to contact dr lechuga today  but was unable to get in touch so came to ed.

## 2023-03-13 NOTE — ED ADULT TRIAGE NOTE - CHIEF COMPLAINT QUOTE
Pt complaining of neck, shoulder and head pain x 1 years, states today it is worse. Pt was seen by an outpatient MD and told he has nerve pain and was given gabapentin with no relief. pt denies chest pain, sob, n/v/d, fever or chills.

## 2023-03-13 NOTE — ED PROVIDER NOTE - CLINICAL SUMMARY MEDICAL DECISION MAKING FREE TEXT BOX
49yo M with chronic neck pain, seen in ED multiple times for same. was recently seen by ortho spine on march 6, dr lechuga and found to have upper extremity weakness. per dr lechuga note "I am concerned in regards to compression along the patient's spinal cord and/or nerve roots. As a result I would like to proceed with an MRI of the patient's cervical spine. In tandem, the patient can continue with a conservative home exercise program. Rx prednisone taper, Tizanidine." pt has been taking both the prednisone taper and tizanidine with no relief and has worsening pain for last 2 days. pt states that there arent any medications that give him relief. pt attempted to contact dr lechuga today  but was unable to get in touch so came to ed.  will give pain control, ortho consult, likely place in cdu vs admit for mri/pain control

## 2023-03-13 NOTE — ED CDU PROVIDER INITIAL DAY NOTE - PHYSICAL EXAMINATION
CONSTITUTIONAL: Well-appearing; well-nourished; in no apparent distress. Non-toxic appearing.   NEURO: Alert & oriented. Gait steady without assistance. Strength 5/5 to upper extremities b/l and with hand .  Sensory and motor functions are grossly intact.  PSYCH: Mood appropriate. Thought processes intact.   NECK: Supple. Diffuse right paraspinous muscle tenderness, no midline bony tenderness.   CARD: Regular rate and rhythm, no murmurs  RESP: No accessory muscle use; breath sounds clear and equal bilaterally; no wheezes, rhonchi, or rales     ABD: Soft; non-distended; non-tender. No guarding or rebound.   MUSCULOSKELETAL/EXTREMITIES: FROM in all four extremities; no extremity edema. Shoulder: No obvious deformity, ecchymosis, contusions, or rash. (+) tenderness to the scapula and superior trapezius distrubution. No tenderness to clavicle, proximal humerus, or scapula. ROM intact but painful and limited.   SKIN: Warm; dry; no apparent lesions or exudate

## 2023-03-13 NOTE — ED CDU PROVIDER INITIAL DAY NOTE - ATTENDING APP SHARED VISIT CONTRIBUTION OF CARE
49yo M with chronic neck pain, seen in ED multiple times for same. was recently seen by ortho spine on march 6, dr lechuga and found to have upper extremity weakness. per dr lechuga note "I am concerned in regards to compression along the patient's spinal cord and/or nerve roots. As a result I would like to proceed with an MRI of the patient's cervical spine. In tandem, the patient can continue with a conservative home exercise program. Rx prednisone taper, Tizanidine." pt has been taking both the prednisone taper and tizanidine with no relief and has worsening pain for last 2 days. pt states that there aren't any medications that give him relief. pt attempted to contact dr lechuga today  but was unable to get in touch so came to ed.  Pt accepted to CDU for pain control and MRI of C-spine. Ortho following.

## 2023-03-13 NOTE — ED CDU PROVIDER INITIAL DAY NOTE - NS ED ROS FT
ROS:  GENERAL: No fever, no chills  CARDIAC: no chest pain  PULMONARY: no cough, no shortness of breath  GI: no abdominal pain, no nausea, no vomiting, no diarrhea, no constipation  SKIN: no rashes  NEURO: (+) R arm paresthesias   MSK: As per HPI   All other systems reviewed as negative.

## 2023-03-13 NOTE — ED ADULT NURSE NOTE - OBJECTIVE STATEMENT
Patient received in ED room 15. A&Ox4 and ambulatory. C/o right sided head, neck and shoulder nerve pain. Reports 10/10 pain level. Appears uncomfortable sitting on edge of bed. Respirations even and unlabored. Speaking in full and complete sentences. Bilateral equal strength and sensation noted. Denies CP, SOB, nausea, vomiting, headache, lightheadedness, dizziness, fever or chills. IV 20g placed to left AC, labs drawn and sent as ordered. Nasal swab obtained and pain meds administered as ordered. Family at bedside. Bed in lowest position, wheels locked, safety maintained. Awaiting ortho.

## 2023-03-14 VITALS
SYSTOLIC BLOOD PRESSURE: 155 MMHG | RESPIRATION RATE: 20 BRPM | TEMPERATURE: 98 F | HEART RATE: 91 BPM | DIASTOLIC BLOOD PRESSURE: 99 MMHG | OXYGEN SATURATION: 100 %

## 2023-03-14 PROCEDURE — 99238 HOSP IP/OBS DSCHRG MGMT 30/<: CPT

## 2023-03-14 PROCEDURE — 72141 MRI NECK SPINE W/O DYE: CPT | Mod: 26,ME

## 2023-03-14 PROCEDURE — G1012: CPT

## 2023-03-14 RX ORDER — GABAPENTIN 400 MG/1
300 CAPSULE ORAL ONCE
Refills: 0 | Status: COMPLETED | OUTPATIENT
Start: 2023-03-14 | End: 2023-03-14

## 2023-03-14 RX ORDER — DIAZEPAM 5 MG
5 TABLET ORAL ONCE
Refills: 0 | Status: DISCONTINUED | OUTPATIENT
Start: 2023-03-14 | End: 2023-03-14

## 2023-03-14 RX ORDER — LIDOCAINE 4 G/100G
1 CREAM TOPICAL ONCE
Refills: 0 | Status: COMPLETED | OUTPATIENT
Start: 2023-03-14 | End: 2023-03-14

## 2023-03-14 RX ORDER — MORPHINE SULFATE 50 MG/1
1 CAPSULE, EXTENDED RELEASE ORAL
Qty: 9 | Refills: 0
Start: 2023-03-14 | End: 2023-03-16

## 2023-03-14 RX ORDER — IBUPROFEN 200 MG
600 TABLET ORAL EVERY 6 HOURS
Refills: 0 | Status: DISCONTINUED | OUTPATIENT
Start: 2023-03-14 | End: 2023-03-17

## 2023-03-14 RX ORDER — MORPHINE SULFATE 50 MG/1
15 CAPSULE, EXTENDED RELEASE ORAL ONCE
Refills: 0 | Status: DISCONTINUED | OUTPATIENT
Start: 2023-03-14 | End: 2023-03-14

## 2023-03-14 RX ORDER — OXYCODONE HYDROCHLORIDE 5 MG/1
5 TABLET ORAL EVERY 6 HOURS
Refills: 0 | Status: DISCONTINUED | OUTPATIENT
Start: 2023-03-14 | End: 2023-03-14

## 2023-03-14 RX ORDER — GABAPENTIN 400 MG/1
1 CAPSULE ORAL
Qty: 14 | Refills: 0
Start: 2023-03-14 | End: 2023-03-20

## 2023-03-14 RX ORDER — DEXAMETHASONE 0.5 MG/5ML
10 ELIXIR ORAL EVERY 8 HOURS
Refills: 0 | Status: DISCONTINUED | OUTPATIENT
Start: 2023-03-14 | End: 2023-03-14

## 2023-03-14 RX ORDER — IBUPROFEN 200 MG
1 TABLET ORAL
Qty: 15 | Refills: 0
Start: 2023-03-14 | End: 2023-03-18

## 2023-03-14 RX ORDER — KETOROLAC TROMETHAMINE 30 MG/ML
15 SYRINGE (ML) INJECTION EVERY 6 HOURS
Refills: 0 | Status: DISCONTINUED | OUTPATIENT
Start: 2023-03-14 | End: 2023-03-14

## 2023-03-14 RX ADMIN — GABAPENTIN 300 MILLIGRAM(S): 400 CAPSULE ORAL at 11:49

## 2023-03-14 RX ADMIN — Medication 5 MILLIGRAM(S): at 09:11

## 2023-03-14 RX ADMIN — MORPHINE SULFATE 4 MILLIGRAM(S): 50 CAPSULE, EXTENDED RELEASE ORAL at 00:26

## 2023-03-14 RX ADMIN — OXYCODONE HYDROCHLORIDE 5 MILLIGRAM(S): 5 TABLET ORAL at 06:48

## 2023-03-14 RX ADMIN — Medication 600 MILLIGRAM(S): at 13:02

## 2023-03-14 RX ADMIN — MORPHINE SULFATE 15 MILLIGRAM(S): 50 CAPSULE, EXTENDED RELEASE ORAL at 14:15

## 2023-03-14 RX ADMIN — MORPHINE SULFATE 4 MILLIGRAM(S): 50 CAPSULE, EXTENDED RELEASE ORAL at 00:11

## 2023-03-14 RX ADMIN — LIDOCAINE 1 PATCH: 4 CREAM TOPICAL at 09:13

## 2023-03-14 RX ADMIN — MORPHINE SULFATE 15 MILLIGRAM(S): 50 CAPSULE, EXTENDED RELEASE ORAL at 12:49

## 2023-03-14 RX ADMIN — Medication 600 MILLIGRAM(S): at 14:15

## 2023-03-14 RX ADMIN — OXYCODONE HYDROCHLORIDE 5 MILLIGRAM(S): 5 TABLET ORAL at 05:48

## 2023-03-14 RX ADMIN — Medication 400 MILLIGRAM(S): at 00:11

## 2023-03-14 RX ADMIN — Medication 15 MILLIGRAM(S): at 03:59

## 2023-03-14 RX ADMIN — Medication 102 MILLIGRAM(S): at 05:48

## 2023-03-14 RX ADMIN — Medication 15 MILLIGRAM(S): at 04:29

## 2023-03-14 RX ADMIN — Medication 5 MILLIGRAM(S): at 01:30

## 2023-03-14 RX ADMIN — Medication 1000 MILLIGRAM(S): at 00:26

## 2023-03-14 NOTE — ED CDU PROVIDER DISPOSITION NOTE - PATIENT PORTAL LINK FT
You can access the FollowMyHealth Patient Portal offered by [facility] by registering at the following website: [email or web address]. By joining [employer]’s FollowMyHealth portal, you will also be able to view your health information using other applications (apps) compatible with our system.

## 2023-03-14 NOTE — ED CDU PROVIDER DISPOSITION NOTE - ATTENDING WITH...
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     Caller Information       Type Contact Phone    11/26/2019 03:21 PM Phone (Incoming) Kenan Padilla (Self) [phone] (H)          Alternative phone number: [phone](Fax)    Turnaround time given to caller:   \"This message will be sent to [state Provider's name]. The clinical team will fulfill your request as soon as they review your message.\"     RICH

## 2023-03-14 NOTE — CONSULT NOTE ADULT - SUBJECTIVE AND OBJECTIVE BOX
Orthopedic Spine Consult Note    Patient is a 48y Male w acute on chronic neck pain and b/l cervical radiculopathy R>L that radiates to the elbows. Has seen ortho spine last week, but steroids/muscle relaxants/pain control has not provided much improvement so came to the ED.    Denies numbness/tingling/paresthesias/weakness. Denies bowel/bladder incontinence. Denies fevers/chills. No other complaints at this time.    HEALTH ISSUES - PROBLEM Dx:          MEDICATIONS  (STANDING):      Allergies    No Known Allergies    Intolerances        PAST MEDICAL & SURGICAL HISTORY:  No pertinent past medical history    S/P appendectomy                              15.3   8.11  )-----------( 259      ( 13 Mar 2023 20:59 )             47.9       13 Mar 2023 20:59    142    |  104    |  15     ----------------------------<  96     3.9     |  28     |  1.31     Ca    9.5        13 Mar 2023 20:59    TPro  6.5    /  Alb  4.1    /  TBili  0.2    /  DBili  x      /  AST  14     /  ALT  16     /  AlkPhos  68     13 Mar 2023 20:59              Vital Signs Last 24 Hrs  T(C): 36.7 (03-13-23 @ 23:00), Max: 36.8 (03-13-23 @ 19:54)  T(F): 98 (03-13-23 @ 23:00), Max: 98.2 (03-13-23 @ 19:54)  HR: 77 (03-13-23 @ 23:00) (77 - 87)  BP: 143/87 (03-13-23 @ 23:00) (143/87 - 151/93)  BP(mean): --  RR: 18 (03-13-23 @ 23:00) (18 - 18)  SpO2: 100% (03-13-23 @ 23:00) (100% - 100%)      Physical exam  Gen: NAD  Resp: no increased WOB on RA  Spine PE:  Skin intact  No gross deformity  No midnline TTP C/T/L/S spine  No bony step offs  No paraspinal muscle ttp/hypertonicity   Negative clonus  Negative babinski  Negative pepper  No saddle anesthesia  + spurling on right    Motor:                   C5                C6              C7               C8           T1   R            4/5                5/5            5/5             5/5          5/5  L             5/5               5/5             5/5             5/5          5/5                L2             L3             L4               L5            S1  R         5/5           5/5          5/5             5/5           5/5  L          5/5          5/5           5/5             5/5           5/5    Sensory:            C5         C6         C7      C8       T1        (0=absent, 1=impaired, 2=normal, NT=not testable)  R         2            2           2        2         2  L          2            2           2        2         2               L2          L3         L4      L5       S1         (0=absent, 1=impaired, 2=normal, NT=not testable)  R         2            2            2        2        2  L          2            2           2        2         2      A/P: 48y Male with acute on chronic cervical radiculopathy R > L    Pain control, steroids, muscle relaxants  WBAT with assistive devices as needed  MRI Cspine  f/u outpatient

## 2023-03-14 NOTE — ED CDU PROVIDER SUBSEQUENT DAY NOTE - ATTENDING APP SHARED VISIT CONTRIBUTION OF CARE
48-year-old male past medical history chronic neck pain presented to ED for worsening of chronic neck pain.  Had seen outpatient ortho-spine and started on steroids muscle relaxant and pain relief with plan for outpatient MRI however symptoms worsening patient presents to ED.  Ortho consulted and patient placed in CDU for MRI.  Exam as above paraspinal spasm through cervical region no thoracic or lumbar tenderness.  Full range of motion of bilateral upper extremities.  Full range of motion of the lower extremities.  5 out of 5 strength in all 4 extremities and sensation to light touch intact in all 4 extremities.  Range of motion of neck.  MRI demonstrating foraminal stenosis along the left C3-4 C4-C5 region.  Patient's symptoms however are on the right and denies any left-sided symptoms.  Orthopedics recommending outpatient follow-up.  Patient achieved a improved symptom medic relief while in observation.  Patient stable for discharge with outpatient follow-up.

## 2023-03-14 NOTE — ED CDU PROVIDER DISPOSITION NOTE - CARE PROVIDER_API CALL
Ayden Sosa (MD)  [facility]  [address], [address]  [place] [address]  Phone: [phone]  Fax: [phone]  Follow Up Time:

## 2023-03-14 NOTE — ED CDU PROVIDER SUBSEQUENT DAY NOTE - HISTORY
VSS. Pt requiring pain medication through the night otherwise comfortably resting in bed. Awaiting MR result.

## 2023-03-14 NOTE — ED CDU PROVIDER DISPOSITION NOTE - CLINICAL COURSE
47yo M with chronic neck pain, seen in ED multiple times for same. was recently seen by ortho spine on march 6, dr lechuga and found to have upper extremity weakness. per dr lechuga note "I am concerned in regards to compression along the patient's spinal cord and/or nerve roots. As a result I would like to proceed with an MRI of the patient's cervical spine. In tandem, the patient can continue with a conservative home exercise program. Rx prednisone taper, Tizanidine." pt has been taking both the prednisone taper and tizanidine with no relief and has worsening pain for last 2 days. pt states that there aren't any medications that give him relief. pt attempted to contact dr lechuga today  but was unable to get in touch so came to ed. In ER - seen by ortho spine - recommeded CDU for MRI cspine.  MRI showing foraminal narrowing but no cord compression. Discussed case with ortho - stable for DC with outpt follow up.

## 2023-03-15 ENCOUNTER — NON-APPOINTMENT (OUTPATIENT)
Age: 48
End: 2023-03-15

## 2023-03-17 ENCOUNTER — NON-APPOINTMENT (OUTPATIENT)
Age: 48
End: 2023-03-17

## 2023-03-20 ENCOUNTER — APPOINTMENT (OUTPATIENT)
Dept: ORTHOPEDIC SURGERY | Facility: CLINIC | Age: 48
End: 2023-03-20
Payer: COMMERCIAL

## 2023-03-20 DIAGNOSIS — M54.2 CERVICALGIA: ICD-10-CM

## 2023-03-20 PROCEDURE — 99214 OFFICE O/P EST MOD 30 MIN: CPT

## 2023-03-20 NOTE — ADDENDUM
[FreeTextEntry1] : I, Faiza Jimenes, acted solely as a scribe for Dr. Ayden Sosa MD on this date 03/20/2023  \par \par All medical record entries made by the Scribe were at my, Dr. Ayden Sosa MD., direction and personally dictated by me on 03/20/2023 . I have reviewed the chart and agree that the record accurately reflects my personal performance of the history, physical exam, assessment and plan. I have also personally directed, reviewed, and agreed with the chart.

## 2023-03-20 NOTE — PHYSICAL EXAM
[de-identified] : Cervical Physical Exam\par \par Gait - Normal\par \par Station - Normal\par \par Sagittal balance - Normal\par \par Compensatory mechanism? - None\par \par Horizontal gaze - Maintained\par \par Reflexes\par Biceps - Normal\par Triceps - Normal\par Brachioradialis - Normal\par Patellar - Normal\par Gastroc - Normal\par Clonus -No\par \par Maldonado´s - None\par \par Shoulder Exam - Normal\par \par Spurling´s - None\par \par Wrist Pulses -2+ radial/ulnar\par \par Foot Pulses -2+ DP/PT\par \par Cervical range of motion - Normal\par \par Sensation\par C5-T1 sensation intact to light touch bilaterally\par \par L1-S1 sensation intact to light touch bilaterally\par \par Motor\par \par 	Deltoid	Biceps	Triceps	WF	WE	IO	\par Right	5/5	5/5	5/5	5/5	5/5	5/5	5/5\par Left	5/5	5/5	5/5	5/5	5/5	5/5	5/5\par \par \par 	IP	Quad	HS	TA	Gastroc	EHL\par Right	5/5	5/5	5/5	5/5	5/5	5/5\par Left	5/5	5/5	5/5	5/5	5/5	5/5  [de-identified] : Lumbar Radiographs Reviewed\par facet arthropathy \par Mild disc degeneration\par \par Cervical Radiographs Reviewed \par Facet Arthropathy \par C4-C5 spondylolisthesis\par disc degeneration somewhat advanced for age \par \par Cervical MRI reviewed \par C6-C7 LT sided disc herniation \par

## 2023-03-20 NOTE — ASSESSMENT
[FreeTextEntry1] : I had a lengthy discussion with the patient in regards to the treatment plan and diagnosis. Furthermore I am concerned in regards to compression along the patient's spinal cord and/or nerve roots.  The patient does have severe spinal stenosis. However, there are no red flags on his clinical physical exam and he is progressing well with conservative management. Therefore we will continue with a conservative home exercise program. I will have the patient follow-up in 3 to 4 weeks for repeat clinical evaluation.  I encouraged the patient to reach out to me directly at any point if their symptoms worsen or change in any way.  He should also continue with Tylenol and ibuprofen as needed for pain relief.
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
## 2023-04-10 ENCOUNTER — APPOINTMENT (OUTPATIENT)
Dept: ORTHOPEDIC SURGERY | Facility: CLINIC | Age: 48
End: 2023-04-10